# Patient Record
Sex: MALE | URBAN - METROPOLITAN AREA
[De-identification: names, ages, dates, MRNs, and addresses within clinical notes are randomized per-mention and may not be internally consistent; named-entity substitution may affect disease eponyms.]

---

## 2021-11-02 ENCOUNTER — APPOINTMENT (RX ONLY)
Dept: URBAN - METROPOLITAN AREA CLINIC 10 | Facility: CLINIC | Age: 61
Setting detail: DERMATOLOGY
End: 2021-11-02

## 2021-11-02 DIAGNOSIS — L72.8 OTHER FOLLICULAR CYSTS OF THE SKIN AND SUBCUTANEOUS TISSUE: ICD-10-CM

## 2021-11-02 PROCEDURE — ? ADDITIONAL NOTES

## 2021-11-02 PROCEDURE — ? COUNSELING

## 2021-11-02 PROCEDURE — ? DIAGNOSIS COMMENT

## 2021-11-02 PROCEDURE — ? PRESCRIPTION

## 2021-11-02 PROCEDURE — 99203 OFFICE O/P NEW LOW 30 MIN: CPT

## 2021-11-02 RX ORDER — DOXYCYCLINE HYCLATE 100 MG/1
CAPSULE, GELATIN COATED ORAL BID
Qty: 30 | Refills: 1 | Status: ERX | COMMUNITY
Start: 2021-11-02

## 2021-11-02 RX ADMIN — DOXYCYCLINE HYCLATE: 100 CAPSULE, GELATIN COATED ORAL at 00:00

## 2021-11-02 ASSESSMENT — LOCATION SIMPLE DESCRIPTION DERM: LOCATION SIMPLE: RIGHT CHEEK

## 2021-11-02 ASSESSMENT — LOCATION DETAILED DESCRIPTION DERM: LOCATION DETAILED: RIGHT INFERIOR MEDIAL MALAR CHEEK

## 2021-11-02 ASSESSMENT — LOCATION ZONE DERM: LOCATION ZONE: FACE

## 2021-11-02 NOTE — PROCEDURE: ADDITIONAL NOTES
Additional Notes: Hx of episodic inflammation and tenderness for about 35 years.\\nTreated with Doxycycline and Mupirocin as needed.\\nRecent flare treated by [facility] last week and diagnosed as cellulitis.\\n - currently taking Doxycycline and applying Mupirocin.\\nAppears consistent with a cyst. Rec’d excision as it has been present for many years.\\nPt would like to have a refill of Doxycycline and defer excision at this time.
Detail Level: Simple
Render Risk Assessment In Note?: no

## 2021-11-02 NOTE — PROCEDURE: DIAGNOSIS COMMENT
Render Risk Assessment In Note?: no
Comment: ~ 30 years of episodic inflammation that responded well to doxycycline. Ill-defined erythema and induration noted. Excision and biopsy vs ILK discussed and patient prefers to continue episodic doxycycline as this has rarely bothers him. Refill given and patient advised to follow in three months.
Detail Level: Simple

## 2021-11-02 NOTE — HPI: CYST
Is This A New Presentation, Or A Follow-Up?: Cyst
Additional History: Dr. Jaime previously prescribed Doxycycline for flares, which helps calm the inflammation. He went to [facility] last week and received Doxycycline and Mupirocin ointment. [facility] diagnosed it as cellulitis.

## 2021-11-30 ENCOUNTER — PREPPED CHART (OUTPATIENT)
Dept: URBAN - METROPOLITAN AREA CLINIC 66 | Facility: CLINIC | Age: 61
End: 2021-11-30

## 2021-11-30 PROBLEM — H35.411 LATTICE DEGENERATION OF RETINA: Status: STABILIZING | Noted: 2021-11-30

## 2021-11-30 PROBLEM — H33.321 ATROPHIC RETINAL HOLE: Status: WELL-CONTROLLED | Noted: 2021-11-30

## 2021-11-30 PROBLEM — H26.8 ACQUIRED CATARACT: Noted: 2021-11-30

## 2021-11-30 PROBLEM — H33.022 RETINAL DETACHMENT WITH MULTIPLE BREAKS: Status: RESOLVED | Noted: 2021-11-30

## 2021-11-30 PROBLEM — H35.372 EPIRETINAL MEMBRANE: Status: STABILIZING | Noted: 2021-11-30

## 2022-06-01 ASSESSMENT — VISUAL ACUITY
OS_CC: 20/20 -2
OD_CC: 20/20 -3

## 2022-06-01 ASSESSMENT — TONOMETRY
OS_IOP_MMHG: 12
OD_IOP_MMHG: 16

## 2022-06-21 ENCOUNTER — FOLLOW UP (OUTPATIENT)
Dept: URBAN - METROPOLITAN AREA CLINIC 66 | Facility: CLINIC | Age: 62
End: 2022-06-21

## 2022-06-21 DIAGNOSIS — H33.022: ICD-10-CM

## 2022-06-21 DIAGNOSIS — H33.321: ICD-10-CM

## 2022-06-21 DIAGNOSIS — H35.411: ICD-10-CM

## 2022-06-21 DIAGNOSIS — H35.372: ICD-10-CM

## 2022-06-21 PROCEDURE — 92202 OPSCPY EXTND ON/MAC DRAW: CPT

## 2022-06-21 PROCEDURE — 92134 CPTRZ OPH DX IMG PST SGM RTA: CPT

## 2022-06-21 PROCEDURE — 92014 COMPRE OPH EXAM EST PT 1/>: CPT

## 2022-06-21 ASSESSMENT — TONOMETRY
OS_IOP_MMHG: 15
OD_IOP_MMHG: 18

## 2022-06-21 ASSESSMENT — VISUAL ACUITY
OS_CC: 20/20-1
OD_CC: 20/20

## 2022-12-20 ENCOUNTER — FOLLOW UP (OUTPATIENT)
Dept: URBAN - METROPOLITAN AREA CLINIC 66 | Facility: CLINIC | Age: 62
End: 2022-12-20

## 2022-12-20 DIAGNOSIS — H35.411: ICD-10-CM

## 2022-12-20 DIAGNOSIS — H35.372: ICD-10-CM

## 2022-12-20 DIAGNOSIS — H33.022: ICD-10-CM

## 2022-12-20 DIAGNOSIS — H33.321: ICD-10-CM

## 2022-12-20 PROCEDURE — 92202 OPSCPY EXTND ON/MAC DRAW: CPT

## 2022-12-20 PROCEDURE — 92014 COMPRE OPH EXAM EST PT 1/>: CPT

## 2022-12-20 PROCEDURE — 92134 CPTRZ OPH DX IMG PST SGM RTA: CPT

## 2022-12-20 ASSESSMENT — VISUAL ACUITY
OS_CC: 20/50
OS_PH: 20/25
OD_CC: 20/25

## 2022-12-20 ASSESSMENT — TONOMETRY
OS_IOP_MMHG: 15
OD_IOP_MMHG: 16

## 2023-08-25 ENCOUNTER — FOLLOW UP (OUTPATIENT)
Dept: URBAN - METROPOLITAN AREA CLINIC 66 | Facility: CLINIC | Age: 63
End: 2023-08-25

## 2023-08-25 DIAGNOSIS — H33.022: ICD-10-CM

## 2023-08-25 DIAGNOSIS — H33.321: ICD-10-CM

## 2023-08-25 DIAGNOSIS — H35.372: ICD-10-CM

## 2023-08-25 DIAGNOSIS — H35.411: ICD-10-CM

## 2023-08-25 DIAGNOSIS — H26.8: ICD-10-CM

## 2023-08-25 PROCEDURE — 92201 OPSCPY EXTND RTA DRAW UNI/BI: CPT

## 2023-08-25 PROCEDURE — 92134 CPTRZ OPH DX IMG PST SGM RTA: CPT

## 2023-08-25 PROCEDURE — 92014 COMPRE OPH EXAM EST PT 1/>: CPT

## 2023-08-25 ASSESSMENT — VISUAL ACUITY
OS_CC: 20/30
OS_PH: 20/20-1
OD_CC: 20/20

## 2023-08-25 ASSESSMENT — TONOMETRY
OD_IOP_MMHG: 15
OS_IOP_MMHG: 13

## 2024-04-17 ENCOUNTER — FOLLOW UP (OUTPATIENT)
Dept: URBAN - METROPOLITAN AREA CLINIC 66 | Facility: CLINIC | Age: 64
End: 2024-04-17

## 2024-04-17 DIAGNOSIS — H35.372: ICD-10-CM

## 2024-04-17 DIAGNOSIS — H35.411: ICD-10-CM

## 2024-04-17 PROCEDURE — 92201 OPSCPY EXTND RTA DRAW UNI/BI: CPT

## 2024-04-17 PROCEDURE — 92014 COMPRE OPH EXAM EST PT 1/>: CPT

## 2024-04-17 PROCEDURE — 92134 CPTRZ OPH DX IMG PST SGM RTA: CPT

## 2024-04-17 ASSESSMENT — VISUAL ACUITY
OS_PH: 20/20
OD_CC: 20/20-
OS_CC: 20/25

## 2024-04-17 ASSESSMENT — TONOMETRY
OS_IOP_MMHG: 17
OD_IOP_MMHG: 19

## 2024-11-08 ENCOUNTER — FOLLOW UP (OUTPATIENT)
Dept: URBAN - METROPOLITAN AREA CLINIC 66 | Facility: CLINIC | Age: 64
End: 2024-11-08

## 2024-11-08 DIAGNOSIS — H33.321: ICD-10-CM

## 2024-11-08 DIAGNOSIS — H33.022: ICD-10-CM

## 2024-11-08 DIAGNOSIS — H35.372: ICD-10-CM

## 2024-11-08 PROCEDURE — 92014 COMPRE OPH EXAM EST PT 1/>: CPT

## 2024-11-08 PROCEDURE — 92134 CPTRZ OPH DX IMG PST SGM RTA: CPT

## 2024-11-08 PROCEDURE — 92201 OPSCPY EXTND RTA DRAW UNI/BI: CPT

## 2024-11-08 ASSESSMENT — VISUAL ACUITY
OS_PH: 20/25-1
OD_CC: 20/20
OS_CC: 20/30-1

## 2024-11-08 ASSESSMENT — TONOMETRY
OD_IOP_MMHG: 16
OS_IOP_MMHG: 15

## 2025-04-02 ENCOUNTER — FOLLOW UP (OUTPATIENT)
Dept: URBAN - METROPOLITAN AREA CLINIC 66 | Facility: CLINIC | Age: 65
End: 2025-04-02

## 2025-04-02 DIAGNOSIS — H33.022: ICD-10-CM

## 2025-04-02 DIAGNOSIS — H35.372: ICD-10-CM

## 2025-04-02 DIAGNOSIS — H33.321: ICD-10-CM

## 2025-04-02 PROCEDURE — 92014 COMPRE OPH EXAM EST PT 1/>: CPT

## 2025-04-02 PROCEDURE — 92201 OPSCPY EXTND RTA DRAW UNI/BI: CPT

## 2025-04-02 PROCEDURE — 92134 CPTRZ OPH DX IMG PST SGM RTA: CPT

## 2025-04-02 ASSESSMENT — VISUAL ACUITY
OS_CC: 20/80-2
OS_PH: 20/25
OD_CC: 20/20-2

## 2025-06-06 ENCOUNTER — FOLLOW UP (OUTPATIENT)
Dept: URBAN - METROPOLITAN AREA CLINIC 66 | Facility: CLINIC | Age: 65
End: 2025-06-06

## 2025-06-06 DIAGNOSIS — H35.372: ICD-10-CM

## 2025-06-06 DIAGNOSIS — H33.321: ICD-10-CM

## 2025-06-06 PROCEDURE — 92014 COMPRE OPH EXAM EST PT 1/>: CPT

## 2025-06-06 PROCEDURE — 92134 CPTRZ OPH DX IMG PST SGM RTA: CPT

## 2025-06-06 PROCEDURE — 92201 OPSCPY EXTND RTA DRAW UNI/BI: CPT

## 2025-06-06 ASSESSMENT — TONOMETRY
OS_IOP_MMHG: 14
OD_IOP_MMHG: 15

## 2025-06-06 ASSESSMENT — VISUAL ACUITY
OD_CC: 20/25+1
OS_CC: 20/20